# Patient Record
Sex: MALE | Race: WHITE | Employment: FULL TIME | URBAN - METROPOLITAN AREA
[De-identification: names, ages, dates, MRNs, and addresses within clinical notes are randomized per-mention and may not be internally consistent; named-entity substitution may affect disease eponyms.]

---

## 2019-12-05 ENCOUNTER — APPOINTMENT (OUTPATIENT)
Dept: GENERAL RADIOLOGY | Age: 39
End: 2019-12-05
Attending: EMERGENCY MEDICINE
Payer: COMMERCIAL

## 2019-12-05 ENCOUNTER — HOSPITAL ENCOUNTER (OUTPATIENT)
Age: 39
Discharge: HOME | End: 2019-12-05
Attending: EMERGENCY MEDICINE
Payer: COMMERCIAL

## 2019-12-05 VITALS
BODY MASS INDEX: 25.18 KG/M2 | OXYGEN SATURATION: 100 % | DIASTOLIC BLOOD PRESSURE: 87 MMHG | RESPIRATION RATE: 16 BRPM | TEMPERATURE: 98 F | SYSTOLIC BLOOD PRESSURE: 124 MMHG | HEIGHT: 73 IN | WEIGHT: 190 LBS | HEART RATE: 84 BPM

## 2019-12-05 DIAGNOSIS — S93.401A SPRAIN OF RIGHT ANKLE, UNSPECIFIED LIGAMENT, INITIAL ENCOUNTER: Primary | ICD-10-CM

## 2019-12-05 PROCEDURE — 73610 X-RAY EXAM OF ANKLE: CPT | Performed by: EMERGENCY MEDICINE

## 2019-12-05 PROCEDURE — 99203 OFFICE O/P NEW LOW 30 MIN: CPT

## 2019-12-05 RX ORDER — FLUTICASONE PROPIONATE 50 MCG
SPRAY, SUSPENSION (ML) NASAL DAILY
COMMUNITY
End: 2021-12-17

## 2019-12-05 NOTE — ED PROVIDER NOTES
Patient Seen in: [address]      History   Patient presents with:   Ankle Pain    Stated Complaint: right ankle pain    HPI    Patient is a 41-year-old male who presents to immediate care complaining of right ankle pain a Conjunctiva/sclera: Conjunctivae normal.      Pupils: Pupils are equal, round, and reactive to light. Neck:      Musculoskeletal: Normal range of motion and neck supple. Cardiovascular:      Rate and Rhythm: Normal rate and regular rhythm.       Puls

## 2019-12-05 NOTE — ED INITIAL ASSESSMENT (HPI)
Pt to IC with pain in right ankle after twisting it while going down the stairs today. Denies previous injury.

## 2020-01-25 ENCOUNTER — HOSPITAL ENCOUNTER (OUTPATIENT)
Age: 40
Discharge: HOME | End: 2020-01-25
Attending: FAMILY MEDICINE
Payer: COMMERCIAL

## 2020-01-25 VITALS
OXYGEN SATURATION: 98 % | DIASTOLIC BLOOD PRESSURE: 82 MMHG | SYSTOLIC BLOOD PRESSURE: 127 MMHG | WEIGHT: 190 LBS | RESPIRATION RATE: 18 BRPM | HEIGHT: 73 IN | BODY MASS INDEX: 25.18 KG/M2 | HEART RATE: 117 BPM | TEMPERATURE: 98 F

## 2020-01-25 DIAGNOSIS — I88.9 LYMPHADENITIS: Primary | ICD-10-CM

## 2020-01-25 PROCEDURE — 99214 OFFICE O/P EST MOD 30 MIN: CPT

## 2020-01-25 PROCEDURE — 99213 OFFICE O/P EST LOW 20 MIN: CPT

## 2020-01-25 RX ORDER — CLINDAMYCIN HYDROCHLORIDE 300 MG/1
300 CAPSULE ORAL 3 TIMES DAILY
Qty: 30 CAPSULE | Refills: 0 | Status: SHIPPED | OUTPATIENT
Start: 2020-01-25 | End: 2020-02-04

## 2020-01-25 NOTE — ED PROVIDER NOTES
Patient Seen in: [address]      History   Patient presents with:  Ear Problem Pain    Stated Complaint: ear pain    HPI    Pt is a 34 yo with worsening left ear pain. No cold sx and no fevers.  Took 2 doses of left over Normal rate. Pulmonary:      Effort: Pulmonary effort is normal.   Lymphadenopathy:      Cervical: Cervical adenopathy present. Skin:     General: Skin is warm. Capillary Refill: Capillary refill takes less than 2 seconds.    Neurological:      Gen

## 2020-01-25 NOTE — ED INITIAL ASSESSMENT (HPI)
Patient reports his ear has been hurting him for about 4 days. Patient reports he had a left over zpak that he started to take, but the left ear is just getting worse.

## 2020-02-14 ENCOUNTER — HOSPITAL ENCOUNTER (OUTPATIENT)
Age: 40
Discharge: HOME | End: 2020-02-14
Attending: FAMILY MEDICINE
Payer: COMMERCIAL

## 2020-02-14 VITALS
OXYGEN SATURATION: 100 % | WEIGHT: 190 LBS | DIASTOLIC BLOOD PRESSURE: 81 MMHG | HEART RATE: 102 BPM | TEMPERATURE: 98 F | BODY MASS INDEX: 25.18 KG/M2 | HEIGHT: 73 IN | RESPIRATION RATE: 16 BRPM | SYSTOLIC BLOOD PRESSURE: 108 MMHG

## 2020-02-14 DIAGNOSIS — J06.9 VIRAL UPPER RESPIRATORY TRACT INFECTION: Primary | ICD-10-CM

## 2020-02-14 LAB — S PYO AG THROAT QL: NEGATIVE

## 2020-02-14 PROCEDURE — 99212 OFFICE O/P EST SF 10 MIN: CPT

## 2020-02-14 PROCEDURE — 87430 STREP A AG IA: CPT

## 2020-02-14 PROCEDURE — 99213 OFFICE O/P EST LOW 20 MIN: CPT

## 2020-02-14 NOTE — ED INITIAL ASSESSMENT (HPI)
Patient states having sore throat x several days, minimal productive cough. Patient denies chest pain or SOB. Patient denies fever, states having chills last night. Patient took last dose of Ibuprofen this morning.   Patient states he had strep x 4 month Statement Selected

## 2020-12-07 ENCOUNTER — HOSPITAL ENCOUNTER (OUTPATIENT)
Age: 40
Discharge: HOME | End: 2020-12-07
Payer: COMMERCIAL

## 2020-12-07 VITALS
DIASTOLIC BLOOD PRESSURE: 93 MMHG | SYSTOLIC BLOOD PRESSURE: 131 MMHG | WEIGHT: 190 LBS | TEMPERATURE: 98 F | OXYGEN SATURATION: 98 % | RESPIRATION RATE: 16 BRPM | HEIGHT: 73 IN | HEART RATE: 112 BPM | BODY MASS INDEX: 25.18 KG/M2

## 2020-12-07 DIAGNOSIS — R09.81 NASAL CONGESTION: Primary | ICD-10-CM

## 2020-12-07 DIAGNOSIS — H66.91 RIGHT OTITIS MEDIA, UNSPECIFIED OTITIS MEDIA TYPE: ICD-10-CM

## 2020-12-07 PROCEDURE — 99203 OFFICE O/P NEW LOW 30 MIN: CPT | Performed by: NURSE PRACTITIONER

## 2020-12-07 RX ORDER — AZITHROMYCIN 250 MG/1
TABLET, FILM COATED ORAL
Qty: 1 PACKAGE | Refills: 0 | Status: SHIPPED | OUTPATIENT
Start: 2020-12-07 | End: 2020-12-12

## 2020-12-07 NOTE — ED PROVIDER NOTES
Patient presents with:  Sinus Problem: Cold since last Thursday with nasal and head congestion. Ears have started to hurt, so I'm afraid it's turning into an ear infection. I have not had COVID exposure (as far as I know).  - Entered by patient      HPI: organization: Not on file        Attends meetings of clubs or organizations: Not on file        Relationship status: Not on file      Intimate partner violence        Fear of current or ex partner: Not on file        Emotionally abused: Not on file and follow-up closely with his primary care doctor. Diagnosis:    ICD-10-CM    1. Nasal congestion  R09.81 SARS-COV-2 RNA,QUAL RT-PCR ([facility])     SARS-COV-2 RNA,QUAL RT-PCR ([facility])   2.  Right otitis media, unspecified otitis media type  H66.91        All

## 2020-12-07 NOTE — ED INITIAL ASSESSMENT (HPI)
Patient states he is having head pressure, nasal congestion and pressure and right ear pain since Thursday. He denies being exposed to covid. He states he stays at home.

## 2020-12-13 ENCOUNTER — HOSPITAL ENCOUNTER (OUTPATIENT)
Age: 40
Discharge: HOME | End: 2020-12-13
Payer: COMMERCIAL

## 2020-12-13 VITALS
HEART RATE: 98 BPM | RESPIRATION RATE: 16 BRPM | SYSTOLIC BLOOD PRESSURE: 129 MMHG | TEMPERATURE: 98 F | OXYGEN SATURATION: 100 % | DIASTOLIC BLOOD PRESSURE: 95 MMHG

## 2020-12-13 DIAGNOSIS — H92.03 OTALGIA OF BOTH EARS: Primary | ICD-10-CM

## 2020-12-13 PROCEDURE — 99213 OFFICE O/P EST LOW 20 MIN: CPT | Performed by: NURSE PRACTITIONER

## 2020-12-13 NOTE — ED PROVIDER NOTES
Patient Seen in: [facility]    History   CC: ear pain  HPI: Mana Junggle 38year old male  who presents c/o bilat ear pain which first began earlier this month associated w/ runny nose, congestion.  Advises he had ear pain 1 week ago that bones  Eyes - sclera not injected, no discharge noted, no periorbital edema  ENT - EAC bilaterally without discharge, TM pearly grey with COL visualized appropriately bilaterally. No mastoid tenderness or overlying erythema.   No pain with palpation to fro

## 2020-12-13 NOTE — ED INITIAL ASSESSMENT (HPI)
Patient states he was seen in [address] [place] [address] on Monday, given a zpack which he finished on Friday. Patient states having bilateral ear pain that has worsened, states having chest congestion. Patient states he tested negative for COVID. Patient c/o productive cough.

## 2021-05-23 ENCOUNTER — HOSPITAL ENCOUNTER (OUTPATIENT)
Age: 41
Discharge: HOME | End: 2021-05-23
Payer: COMMERCIAL

## 2021-05-23 ENCOUNTER — APPOINTMENT (OUTPATIENT)
Dept: GENERAL RADIOLOGY | Age: 41
End: 2021-05-23
Attending: NURSE PRACTITIONER
Payer: COMMERCIAL

## 2021-05-23 VITALS
HEART RATE: 99 BPM | TEMPERATURE: 98 F | SYSTOLIC BLOOD PRESSURE: 135 MMHG | DIASTOLIC BLOOD PRESSURE: 78 MMHG | OXYGEN SATURATION: 99 % | RESPIRATION RATE: 18 BRPM

## 2021-05-23 DIAGNOSIS — R07.81 RIB PAIN: Primary | ICD-10-CM

## 2021-05-23 DIAGNOSIS — S22.31XA CLOSED FRACTURE OF ONE RIB OF RIGHT SIDE, INITIAL ENCOUNTER: ICD-10-CM

## 2021-05-23 PROCEDURE — 71101 X-RAY EXAM UNILAT RIBS/CHEST: CPT | Performed by: NURSE PRACTITIONER

## 2021-05-23 PROCEDURE — 99213 OFFICE O/P EST LOW 20 MIN: CPT | Performed by: NURSE PRACTITIONER

## 2021-05-23 RX ORDER — TRAMADOL HYDROCHLORIDE 50 MG/1
TABLET ORAL EVERY 6 HOURS PRN
Qty: 20 TABLET | Refills: 0 | Status: SHIPPED | OUTPATIENT
Start: 2021-05-23 | End: 2021-05-30

## 2021-05-23 NOTE — ED INITIAL ASSESSMENT (HPI)
Patient fell a week ago on his deck that he was working on and landed on his right side on his mid rib area. He states he has been having pain in that area that is not getting better for a full week. He fell a week ago today.

## 2021-05-23 NOTE — ED PROVIDER NOTES
Patient Seen in: [facility]      History   Patient presents with:  Trauma: Entered by patient    Stated Complaint: Abdominal Pain    HPI/Subjective: Fall  The accident occurred more than 2 days ago. The fall occurred while standing.  He l Pulse 99   Resp 18   Temp 97.7 °F (36.5 °C)   Temp src Temporal   SpO2 99 %   O2 Device None (Room air)       Current:/78   Pulse 99   Temp 97.7 °F (36.5 °C) (Temporal)   Resp 18   SpO2 99%         Physical Exam  Vitals and nursing note reviewed. Reviewed - No data to display        CONCLUSION:   1. Subacute-appearing right posterolateral 10th rib fracture.     2. No pneumothorax.    3. Negative for radiographically evident acute intrathoracic process.     4. Sequelae of remote granulomatous disease of the right ribs subacute appearing right posterior lateral 10th rib fracture. No pneumothorax. Negative for radiographic evident acute intrathoracic process. Prescription for tramadol was sent to the pharmacy. Rice therapy advised.   Motrin over-the-c

## 2021-06-21 ENCOUNTER — HOSPITAL ENCOUNTER (OUTPATIENT)
Age: 41
Discharge: HOME | End: 2021-06-21
Payer: COMMERCIAL

## 2021-06-21 VITALS
RESPIRATION RATE: 17 BRPM | SYSTOLIC BLOOD PRESSURE: 130 MMHG | OXYGEN SATURATION: 98 % | HEIGHT: 73 IN | TEMPERATURE: 97 F | HEART RATE: 82 BPM | BODY MASS INDEX: 25.84 KG/M2 | DIASTOLIC BLOOD PRESSURE: 90 MMHG | WEIGHT: 195 LBS

## 2021-06-21 DIAGNOSIS — J02.0 STREPTOCOCCAL SORE THROAT: Primary | ICD-10-CM

## 2021-06-21 PROCEDURE — 99213 OFFICE O/P EST LOW 20 MIN: CPT | Performed by: NURSE PRACTITIONER

## 2021-06-21 PROCEDURE — 87880 STREP A ASSAY W/OPTIC: CPT | Performed by: NURSE PRACTITIONER

## 2021-06-21 RX ORDER — AZITHROMYCIN 500 MG/1
500 TABLET, FILM COATED ORAL DAILY
Qty: 5 TABLET | Refills: 0 | Status: SHIPPED | OUTPATIENT
Start: 2021-06-21 | End: 2021-07-02

## 2021-06-21 NOTE — ED PROVIDER NOTES
Patient Seen in: [facility]      History   Patient presents with:  Sore Throat    Stated Complaint: Sore throat    HPI/Subjective:   HPI    38 yr old male here today with 2-3 days of strep throat. Denies fever, chills, nausea, vomiting.  Amy S1-S2. Regular rate and rhythm. Lungs: good inspiratory effort. +air entry bilaterally without wheezes, rhonchi, crackles. No accessory muscle use or tachypnea. Extremities: No edema. Pulses 2+ extremities. Skin: No rash noted.   No ec dysphagia or difficulty handing secretions. No dental pain. Afebrile, nontoxic appearing and does not meet SIRS criteria. Rapid strep test is positive. Does not appear clinically dehydrated, tolerating oral intake in the emergency room.   Prescription g

## 2021-06-29 ENCOUNTER — HOSPITAL ENCOUNTER (OUTPATIENT)
Age: 41
Discharge: HOME | End: 2021-06-29
Payer: COMMERCIAL

## 2021-06-29 VITALS
WEIGHT: 190 LBS | RESPIRATION RATE: 16 BRPM | HEART RATE: 95 BPM | HEIGHT: 73 IN | SYSTOLIC BLOOD PRESSURE: 121 MMHG | BODY MASS INDEX: 25.18 KG/M2 | DIASTOLIC BLOOD PRESSURE: 86 MMHG | TEMPERATURE: 98 F | OXYGEN SATURATION: 98 %

## 2021-06-29 DIAGNOSIS — J02.0 STREPTOCOCCAL SORE THROAT: Primary | ICD-10-CM

## 2021-06-29 PROCEDURE — 99213 OFFICE O/P EST LOW 20 MIN: CPT | Performed by: NURSE PRACTITIONER

## 2021-06-29 PROCEDURE — 87880 STREP A ASSAY W/OPTIC: CPT | Performed by: NURSE PRACTITIONER

## 2021-06-29 RX ORDER — CLARITHROMYCIN 500 MG/1
500 TABLET, COATED ORAL 2 TIMES DAILY
Qty: 20 TABLET | Refills: 0 | Status: SHIPPED | OUTPATIENT
Start: 2021-06-29 | End: 2021-07-02

## 2021-06-29 NOTE — ED PROVIDER NOTES
Patient Seen in: [facility]      History   Patient presents with:  Sore Throat: Entered by patient    Stated Complaint: Sore Throat    HPI/Subjective:   HPI    38year old male presents with sore throat that began this a.m. he states he did Positive (*)     All other components within normal limits                    MDM        Pt declines Zpak  history of anaphylaxis to penicillins  We will treat with clarithromycin 250 mg twice daily x10 days PMD follow-up strict return precautions given

## 2021-07-02 ENCOUNTER — HOSPITAL ENCOUNTER (OUTPATIENT)
Age: 41
Discharge: HOME | End: 2021-07-02
Payer: COMMERCIAL

## 2021-07-02 ENCOUNTER — HOSPITAL ENCOUNTER (OUTPATIENT)
Age: 41
Discharge: LEFT BEFORE DISCHARGE | End: 2021-07-02
Payer: COMMERCIAL

## 2021-07-02 VITALS
DIASTOLIC BLOOD PRESSURE: 92 MMHG | SYSTOLIC BLOOD PRESSURE: 120 MMHG | OXYGEN SATURATION: 100 % | RESPIRATION RATE: 20 BRPM | HEART RATE: 105 BPM | TEMPERATURE: 98 F

## 2021-07-02 VITALS
TEMPERATURE: 98 F | RESPIRATION RATE: 16 BRPM | HEART RATE: 88 BPM | SYSTOLIC BLOOD PRESSURE: 138 MMHG | WEIGHT: 190 LBS | OXYGEN SATURATION: 99 % | BODY MASS INDEX: 25.18 KG/M2 | HEIGHT: 73 IN | DIASTOLIC BLOOD PRESSURE: 94 MMHG

## 2021-07-02 DIAGNOSIS — J02.0 STREP PHARYNGITIS: Primary | ICD-10-CM

## 2021-07-02 PROCEDURE — 99213 OFFICE O/P EST LOW 20 MIN: CPT | Performed by: PHYSICIAN ASSISTANT

## 2021-07-02 RX ORDER — CLINDAMYCIN HYDROCHLORIDE 300 MG/1
300 CAPSULE ORAL 3 TIMES DAILY
Qty: 21 CAPSULE | Refills: 0 | Status: SHIPPED | OUTPATIENT
Start: 2021-07-02 | End: 2021-07-09

## 2021-07-02 RX ORDER — DEXAMETHASONE SODIUM PHOSPHATE 10 MG/ML
10 INJECTION, SOLUTION INTRAMUSCULAR; INTRAVENOUS ONCE
Status: COMPLETED | OUTPATIENT
Start: 2021-07-02 | End: 2021-07-02

## 2021-07-02 NOTE — ED INITIAL ASSESSMENT (HPI)
Pt states seen here 6/28 and dx'd with strep. States no better with clarithromycin. Pt continues with sore throat and feeling tired x7 days. States also had strep throat 2 weeks ago when his kids had strep. Awake/alert.  Breathing easy and even without d

## 2021-07-02 NOTE — ED PROVIDER NOTES
Patient Seen in: [facility]      History   Patient presents with:  Sore Throat: Entered by patient  Recheck    Stated Complaint: Sore Throat    HPI/Subjective:   HPI    39 yo male with here for sore throat.   Patient was diagnosed with strep round, and reactive to light. Cardiovascular:      Rate and Rhythm: Normal rate. Pulmonary:      Effort: Pulmonary effort is normal.   Abdominal:      General: Abdomen is flat. Musculoskeletal:         General: Normal range of motion.       Cervical b

## 2021-07-05 ENCOUNTER — HOSPITAL ENCOUNTER (OUTPATIENT)
Age: 41
Discharge: HOME | End: 2021-07-05
Payer: COMMERCIAL

## 2021-07-05 VITALS
DIASTOLIC BLOOD PRESSURE: 88 MMHG | HEART RATE: 92 BPM | OXYGEN SATURATION: 98 % | TEMPERATURE: 98 F | RESPIRATION RATE: 20 BRPM | SYSTOLIC BLOOD PRESSURE: 126 MMHG

## 2021-07-05 DIAGNOSIS — H92.02 LEFT EAR PAIN: Primary | ICD-10-CM

## 2021-07-05 LAB — SARS-COV-2 RNA RESP QL NAA+PROBE: NOT DETECTED

## 2021-07-05 PROCEDURE — U0002 COVID-19 LAB TEST NON-CDC: HCPCS | Performed by: NURSE PRACTITIONER

## 2021-07-05 PROCEDURE — 99213 OFFICE O/P EST LOW 20 MIN: CPT | Performed by: NURSE PRACTITIONER

## 2021-07-05 NOTE — ED PROVIDER NOTES
Patient Seen in: [facility]    History   CC: ear pain  HPI: Eve Knife 38year old male  who presents c/o left ear pain which has been present for the last 1 to 2 days. States he has been treated for strep for the last 2 weeks.   Genny Holter Physical Exam     ED Triage Vitals [07/05/21 0843]   /88   Pulse 108   Resp 20   Temp 97.5 °F (36.4 °C)   Temp src    SpO2 100 %   O2 Device None (Room air)       Current:/88   Pulse 108   Temp 97.5 °F (36.4 °C)   Resp 20   SpO2 100% Has an appointment with ENT, Dr. Khushi Decker on 7/9.       Discussed general otalgia instructions with patient, continued antibiotic use as well as initiation of probiotic, Florastor use, continued follow-up with Dr. Khushi Decker on 7/9 as previously planned as well as

## 2021-07-05 NOTE — ED INITIAL ASSESSMENT (HPI)
Pt presents to the IC with c/o left ear pain for the last couple days. Pt has been treated for strep throat with 3 different antibiotics in the last 2 weeks with improvement mid clindamycin treatment at this time.  Pt is concerned that he may have an ear in

## 2021-07-12 ENCOUNTER — OFFICE VISIT (OUTPATIENT)
Dept: OTOLARYNGOLOGY | Facility: CLINIC | Age: 41
End: 2021-07-12
Payer: COMMERCIAL

## 2021-07-12 VITALS
SYSTOLIC BLOOD PRESSURE: 126 MMHG | HEART RATE: 80 BPM | HEIGHT: 73 IN | WEIGHT: 190 LBS | BODY MASS INDEX: 25.18 KG/M2 | TEMPERATURE: 97 F | DIASTOLIC BLOOD PRESSURE: 93 MMHG

## 2021-07-12 DIAGNOSIS — J03.90 TONSILLITIS: Primary | ICD-10-CM

## 2021-07-12 PROCEDURE — 99203 OFFICE O/P NEW LOW 30 MIN: CPT | Performed by: OTOLARYNGOLOGY

## 2021-07-12 PROCEDURE — 3008F BODY MASS INDEX DOCD: CPT | Performed by: OTOLARYNGOLOGY

## 2021-07-12 PROCEDURE — 3080F DIAST BP >= 90 MM HG: CPT | Performed by: OTOLARYNGOLOGY

## 2021-07-12 PROCEDURE — 3074F SYST BP LT 130 MM HG: CPT | Performed by: OTOLARYNGOLOGY

## 2021-07-12 NOTE — PROGRESS NOTES
Dane Evans is a 38year old male.   Patient presents with:  Sore Throat: , dx with strep at Urgent care 6/21 started on zpack , did help and than symptoms returned and  pt tested  positive  for strep 06/29,  started on clarithromycin and advised to 190 lb (86.2 kg)   BMI 25.07 kg/m²        Constitutional Normal Overall appearance - Normal.   Psychiatric Normal Orientation - Oriented to time, place, person & situation. Appropriate mood and affect.    Neck Exam Normal Inspection - Normal. Palpation - No

## 2021-08-05 ENCOUNTER — HOSPITAL ENCOUNTER (OUTPATIENT)
Age: 41
Discharge: HOME | End: 2021-08-05
Payer: COMMERCIAL

## 2021-08-05 VITALS
HEIGHT: 73 IN | HEART RATE: 99 BPM | RESPIRATION RATE: 16 BRPM | WEIGHT: 190 LBS | DIASTOLIC BLOOD PRESSURE: 85 MMHG | TEMPERATURE: 97 F | OXYGEN SATURATION: 99 % | BODY MASS INDEX: 25.18 KG/M2 | SYSTOLIC BLOOD PRESSURE: 136 MMHG

## 2021-08-05 DIAGNOSIS — H92.02 LEFT EAR PAIN: Primary | ICD-10-CM

## 2021-08-05 DIAGNOSIS — Z20.822 LAB TEST NEGATIVE FOR COVID-19 VIRUS: ICD-10-CM

## 2021-08-05 DIAGNOSIS — J02.9 VIRAL PHARYNGITIS: ICD-10-CM

## 2021-08-05 LAB
S PYO AG THROAT QL: NEGATIVE
SARS-COV-2 RNA RESP QL NAA+PROBE: NOT DETECTED

## 2021-08-05 PROCEDURE — U0002 COVID-19 LAB TEST NON-CDC: HCPCS | Performed by: NURSE PRACTITIONER

## 2021-08-05 PROCEDURE — 87880 STREP A ASSAY W/OPTIC: CPT | Performed by: NURSE PRACTITIONER

## 2021-08-05 PROCEDURE — 99213 OFFICE O/P EST LOW 20 MIN: CPT | Performed by: NURSE PRACTITIONER

## 2021-08-05 NOTE — ED INITIAL ASSESSMENT (HPI)
Pt c/o L ear pain and sore throat x2 days. No fever. No cold/cough. Awake/alert. Breathing easy and even without distress. Speech clear. Skin warm, dry and pink.

## 2021-08-05 NOTE — ED PROVIDER NOTES
Patient Seen in: [facility]      History   Patient presents with:  Ear Pain  Sore Throat    Stated Complaint: ear pain    HPI/Subjective:   HPI    This is 42yo male presenting with ear pain and sore throat.   Patient states, for 2 days he has sounds: Normal heart sounds. Pulmonary:      Effort: Pulmonary effort is normal.      Breath sounds: Normal breath sounds. Abdominal:      Palpations: Abdomen is soft. Musculoskeletal:         General: Normal range of motion.       Cervical back: Norm worsen or persist          Medications Prescribed:  Current Discharge Medication List

## 2021-08-27 ENCOUNTER — HOSPITAL ENCOUNTER (OUTPATIENT)
Age: 41
Discharge: HOME | End: 2021-08-27
Attending: PHYSICIAN ASSISTANT
Payer: COMMERCIAL

## 2021-08-27 VITALS
BODY MASS INDEX: 24.52 KG/M2 | SYSTOLIC BLOOD PRESSURE: 141 MMHG | HEIGHT: 73 IN | HEART RATE: 100 BPM | RESPIRATION RATE: 16 BRPM | WEIGHT: 185 LBS | DIASTOLIC BLOOD PRESSURE: 94 MMHG | OXYGEN SATURATION: 98 % | TEMPERATURE: 98 F

## 2021-08-27 DIAGNOSIS — Z20.822 ENCOUNTER FOR LABORATORY TESTING FOR COVID-19 VIRUS: Primary | ICD-10-CM

## 2021-08-27 DIAGNOSIS — R03.0 ELEVATED BLOOD PRESSURE READING: ICD-10-CM

## 2021-08-27 LAB — SARS-COV-2 RNA RESP QL NAA+PROBE: NOT DETECTED

## 2021-08-27 PROCEDURE — 99212 OFFICE O/P EST SF 10 MIN: CPT | Performed by: PHYSICIAN ASSISTANT

## 2021-08-27 PROCEDURE — U0002 COVID-19 LAB TEST NON-CDC: HCPCS | Performed by: PHYSICIAN ASSISTANT

## 2021-08-27 NOTE — ED INITIAL ASSESSMENT (HPI)
Pt presents to the IC with c/o wanting a covid test s/p being exposed to an unvaccinated person. Pt is asymptomatic.

## 2021-08-27 NOTE — ED PROVIDER NOTES
Patient Seen in: [facility]    History   Patient presents with:  Covid-19 Test: Entered by patient    Stated Complaint: Covid-19 Test    HPI      Edilberto Tellez is a 38year old male who presents to immediate care requesting testing for kg/m²     PULSE OX within normal limits on room air as interpreted by this provider. Constitutional: The patient is cooperative. Appears well-developed and well-nourished. No acute distress. Psychological: Alert, No abnormalities of mood, affect.   Hea I've explained the importance of following up with their doctor as instructed. The patient verbalized understanding of the discharge instructions and plan. The patient was informed of their elevated blood pressure reading at immediate care.   They were i

## 2021-08-28 ENCOUNTER — HOSPITAL ENCOUNTER (OUTPATIENT)
Age: 41
Discharge: HOME | End: 2021-08-28
Payer: COMMERCIAL

## 2021-08-28 DIAGNOSIS — Z20.822 ENCOUNTER FOR LABORATORY TESTING FOR COVID-19 VIRUS: Primary | ICD-10-CM

## 2021-08-28 PROCEDURE — 99212 OFFICE O/P EST SF 10 MIN: CPT | Performed by: NURSE PRACTITIONER

## 2021-08-28 NOTE — ED PROVIDER NOTES
Patient Seen in: [facility]      History   Patient presents with:  Covid-19 Test: Entered by patient    Stated Complaint: Covid-19 Test    HPI/Subjective:   HPI    Patient presents to the immediate care requesting a COVID-19 test.  Patient s noted in HPI. Constitutional and vital signs reviewed. All other systems reviewed and negative except as noted above.     Physical Exam     ED Triage Vitals   BP    Pulse    Resp    Temp    Temp src    SpO2    O2 Device        Current:There were no vi sore throat which he declines. Patient states he is going to leave to follow-up with the rapid testing center. Patient advised to follow-up with his primary care doctor in 2-3 days.   Patient verbalizes understanding of discharge instructions and plan of

## 2021-10-10 ENCOUNTER — HOSPITAL ENCOUNTER (OUTPATIENT)
Age: 41
Discharge: HOME | End: 2021-10-10
Payer: COMMERCIAL

## 2021-10-10 VITALS — TEMPERATURE: 98 F

## 2021-10-10 PROCEDURE — 90686 IIV4 VACC NO PRSV 0.5 ML IM: CPT | Performed by: EMERGENCY MEDICINE

## 2021-10-10 PROCEDURE — 90471 IMMUNIZATION ADMIN: CPT | Performed by: EMERGENCY MEDICINE

## 2021-12-17 PROBLEM — F32.9 CURRENT EPISODE OF MAJOR DEPRESSIVE DISORDER WITHOUT PRIOR EPISODE: Status: ACTIVE | Noted: 2021-12-17

## 2021-12-17 PROBLEM — E78.00 HYPERCHOLESTEROLEMIA: Status: ACTIVE | Noted: 2021-12-17

## 2021-12-31 ENCOUNTER — HOSPITAL ENCOUNTER (OUTPATIENT)
Age: 41
Discharge: HOME | End: 2021-12-31
Payer: COMMERCIAL

## 2021-12-31 VITALS
DIASTOLIC BLOOD PRESSURE: 90 MMHG | OXYGEN SATURATION: 98 % | TEMPERATURE: 98 F | SYSTOLIC BLOOD PRESSURE: 118 MMHG | RESPIRATION RATE: 19 BRPM | HEART RATE: 108 BPM

## 2021-12-31 DIAGNOSIS — Z20.822 ENCOUNTER FOR SCREENING LABORATORY TESTING FOR COVID-19 VIRUS: ICD-10-CM

## 2021-12-31 DIAGNOSIS — B34.9 VIRAL ILLNESS: ICD-10-CM

## 2021-12-31 DIAGNOSIS — J02.0 STREPTOCOCCAL SORE THROAT: Primary | ICD-10-CM

## 2021-12-31 LAB — S PYO AG THROAT QL: POSITIVE

## 2021-12-31 PROCEDURE — 99213 OFFICE O/P EST LOW 20 MIN: CPT | Performed by: NURSE PRACTITIONER

## 2021-12-31 PROCEDURE — 87880 STREP A ASSAY W/OPTIC: CPT | Performed by: NURSE PRACTITIONER

## 2021-12-31 RX ORDER — CLINDAMYCIN HYDROCHLORIDE 300 MG/1
300 CAPSULE ORAL 3 TIMES DAILY
Qty: 30 CAPSULE | Refills: 0 | Status: SHIPPED | OUTPATIENT
Start: 2021-12-31 | End: 2022-01-10

## 2021-12-31 NOTE — ED PROVIDER NOTES
Patient Seen in: [facility]      History   Patient presents with:  Cough/URI  Sore Throat    Stated Complaint: congestion    Subjective:   Well-appearing 44-year-old male presents with complaints of nasal congestion, body aches, and throat p Left and right tympanic membranes normal.      Nose: Congestion present. Mouth/Throat:      Lips: Pink. Mouth: Mucous membranes are moist.      Pharynx: Uvula midline. Posterior oropharyngeal erythema present.  No pharyngeal swelling, oropharyngea Clinical Impression:  Streptococcal sore throat  (primary encounter diagnosis)  Encounter for screening laboratory testing for COVID-19 virus  Viral illness     Disposition:  Discharge  12/31/2021 10:03 am    Follow-up:  Emeli Perez MD  [address]

## 2021-12-31 NOTE — ED INITIAL ASSESSMENT (HPI)
Pt w/ cold symptoms x 5 days. Vaccinated. Feels nasal congestion, taking  Claritin and Flonase with minor relief.

## 2022-01-03 LAB — SARS-COV-2 RNA,QUAL, RT-PCR: NOT DETECTED

## 2022-03-31 ENCOUNTER — HOSPITAL ENCOUNTER (OUTPATIENT)
Age: 42
Discharge: HOME | End: 2022-03-31
Payer: COMMERCIAL

## 2022-03-31 VITALS
SYSTOLIC BLOOD PRESSURE: 136 MMHG | DIASTOLIC BLOOD PRESSURE: 97 MMHG | TEMPERATURE: 98 F | RESPIRATION RATE: 18 BRPM | HEART RATE: 92 BPM | OXYGEN SATURATION: 98 %

## 2022-03-31 DIAGNOSIS — J06.9 VIRAL URI: Primary | ICD-10-CM

## 2022-03-31 LAB — S PYO AG THROAT QL: NEGATIVE

## 2022-03-31 PROCEDURE — 99212 OFFICE O/P EST SF 10 MIN: CPT | Performed by: PHYSICIAN ASSISTANT

## 2022-03-31 PROCEDURE — 87880 STREP A ASSAY W/OPTIC: CPT | Performed by: PHYSICIAN ASSISTANT

## 2022-03-31 NOTE — ED INITIAL ASSESSMENT (HPI)
Patient is here with cold symptoms that started three days ago. He just wants to be checked out d/t travel in a few days.

## 2022-05-11 ENCOUNTER — APPOINTMENT (OUTPATIENT)
Dept: GENERAL RADIOLOGY | Age: 42
End: 2022-05-11
Attending: PHYSICIAN ASSISTANT
Payer: COMMERCIAL

## 2022-05-11 ENCOUNTER — HOSPITAL ENCOUNTER (OUTPATIENT)
Age: 42
Discharge: HOME | End: 2022-05-11
Payer: COMMERCIAL

## 2022-05-11 VITALS
TEMPERATURE: 99 F | HEART RATE: 96 BPM | WEIGHT: 188 LBS | HEIGHT: 73 IN | RESPIRATION RATE: 16 BRPM | BODY MASS INDEX: 24.92 KG/M2 | OXYGEN SATURATION: 98 %

## 2022-05-11 DIAGNOSIS — J02.9 ACUTE VIRAL PHARYNGITIS: Primary | ICD-10-CM

## 2022-05-11 DIAGNOSIS — R05.9 COUGH: ICD-10-CM

## 2022-05-11 DIAGNOSIS — Z20.822 ENCOUNTER FOR SCREENING LABORATORY TESTING FOR COVID-19 VIRUS: ICD-10-CM

## 2022-05-11 LAB
S PYO AG THROAT QL: NEGATIVE
SARS-COV-2 RNA RESP QL NAA+PROBE: NOT DETECTED

## 2022-05-11 PROCEDURE — 87880 STREP A ASSAY W/OPTIC: CPT | Performed by: PHYSICIAN ASSISTANT

## 2022-05-11 PROCEDURE — U0002 COVID-19 LAB TEST NON-CDC: HCPCS | Performed by: PHYSICIAN ASSISTANT

## 2022-05-11 PROCEDURE — 99213 OFFICE O/P EST LOW 20 MIN: CPT | Performed by: PHYSICIAN ASSISTANT

## 2022-05-11 PROCEDURE — 71046 X-RAY EXAM CHEST 2 VIEWS: CPT | Performed by: PHYSICIAN ASSISTANT

## 2022-05-11 RX ORDER — IBUPROFEN 600 MG/1
TABLET ORAL
Qty: 20 TABLET | Refills: 0 | Status: SHIPPED | OUTPATIENT
Start: 2022-05-11

## 2022-10-01 ENCOUNTER — HOSPITAL ENCOUNTER (OUTPATIENT)
Age: 42
Discharge: HOME | End: 2022-10-01
Payer: COMMERCIAL

## 2022-10-01 VITALS
HEART RATE: 95 BPM | TEMPERATURE: 98 F | OXYGEN SATURATION: 98 % | DIASTOLIC BLOOD PRESSURE: 82 MMHG | SYSTOLIC BLOOD PRESSURE: 139 MMHG | RESPIRATION RATE: 18 BRPM

## 2022-10-01 DIAGNOSIS — J02.9 VIRAL PHARYNGITIS: Primary | ICD-10-CM

## 2022-10-01 LAB
S PYO AG THROAT QL: NEGATIVE
SARS-COV-2 RNA RESP QL NAA+PROBE: NOT DETECTED

## 2022-12-07 ENCOUNTER — HOSPITAL ENCOUNTER (OUTPATIENT)
Age: 42
Discharge: HOME | End: 2022-12-07
Payer: COMMERCIAL

## 2022-12-07 VITALS
SYSTOLIC BLOOD PRESSURE: 132 MMHG | OXYGEN SATURATION: 98 % | DIASTOLIC BLOOD PRESSURE: 87 MMHG | RESPIRATION RATE: 18 BRPM | HEART RATE: 95 BPM | TEMPERATURE: 98 F

## 2022-12-07 DIAGNOSIS — J11.1 INFLUENZA: ICD-10-CM

## 2022-12-07 DIAGNOSIS — H66.91 RIGHT OTITIS MEDIA, UNSPECIFIED OTITIS MEDIA TYPE: Primary | ICD-10-CM

## 2022-12-07 LAB
POCT INFLUENZA A: POSITIVE
POCT INFLUENZA B: NEGATIVE

## 2022-12-07 PROCEDURE — 99213 OFFICE O/P EST LOW 20 MIN: CPT | Performed by: NURSE PRACTITIONER

## 2022-12-07 PROCEDURE — 87502 INFLUENZA DNA AMP PROBE: CPT | Performed by: NURSE PRACTITIONER

## 2022-12-07 RX ORDER — CLINDAMYCIN HYDROCHLORIDE 300 MG/1
300 CAPSULE ORAL 3 TIMES DAILY
Qty: 30 CAPSULE | Refills: 0 | Status: SHIPPED | OUTPATIENT
Start: 2022-12-07 | End: 2022-12-17

## 2022-12-07 NOTE — ED INITIAL ASSESSMENT (HPI)
Pt presents with fever 2 weeks ago. Fever has subsided. Pt reports sore throat with right ear pain and cough x 2 days.

## 2022-12-07 NOTE — DISCHARGE INSTRUCTIONS
Tylenol or Motrin as needed for pain or fever. Take the antibiotics as prescribed. Continue supportive care. Follow-up with your doctor. Return for any concerns.

## 2023-02-25 ENCOUNTER — HOSPITAL ENCOUNTER (OUTPATIENT)
Age: 43
Discharge: HOME | End: 2023-02-25
Payer: COMMERCIAL

## 2023-02-25 VITALS
TEMPERATURE: 98 F | HEART RATE: 76 BPM | DIASTOLIC BLOOD PRESSURE: 90 MMHG | RESPIRATION RATE: 18 BRPM | SYSTOLIC BLOOD PRESSURE: 126 MMHG | OXYGEN SATURATION: 99 %

## 2023-02-25 DIAGNOSIS — H66.92 LEFT OTITIS MEDIA, UNSPECIFIED OTITIS MEDIA TYPE: ICD-10-CM

## 2023-02-25 DIAGNOSIS — H92.02 OTALGIA, LEFT EAR: Primary | ICD-10-CM

## 2023-02-25 PROCEDURE — 99213 OFFICE O/P EST LOW 20 MIN: CPT | Performed by: NURSE PRACTITIONER

## 2023-02-25 RX ORDER — DOXYCYCLINE HYCLATE 100 MG/1
100 CAPSULE ORAL 2 TIMES DAILY
Qty: 14 CAPSULE | Refills: 0 | Status: SHIPPED | OUTPATIENT
Start: 2023-02-25 | End: 2023-03-04

## 2023-02-25 NOTE — DISCHARGE INSTRUCTIONS
Ibuprofen 400-600 mg every 8 hours for 2-3 days  Take Flonase as directed on packaging  Take over the counter Mucinex  If no improvement or worse, start antibiotic as discussed

## 2023-08-09 ENCOUNTER — HOSPITAL ENCOUNTER (OUTPATIENT)
Age: 43
Discharge: HOME | End: 2023-08-09
Payer: COMMERCIAL

## 2023-08-09 VITALS
SYSTOLIC BLOOD PRESSURE: 133 MMHG | TEMPERATURE: 97 F | OXYGEN SATURATION: 100 % | DIASTOLIC BLOOD PRESSURE: 85 MMHG | RESPIRATION RATE: 18 BRPM | HEART RATE: 80 BPM

## 2023-08-09 DIAGNOSIS — B97.89 VIRAL SORE THROAT: Primary | ICD-10-CM

## 2023-08-09 DIAGNOSIS — J02.8 VIRAL SORE THROAT: Primary | ICD-10-CM

## 2023-08-09 LAB — S PYO AG THROAT QL: NEGATIVE

## 2023-08-09 PROCEDURE — 99213 OFFICE O/P EST LOW 20 MIN: CPT | Performed by: NURSE PRACTITIONER

## 2023-08-09 PROCEDURE — 87880 STREP A ASSAY W/OPTIC: CPT | Performed by: NURSE PRACTITIONER

## 2023-11-01 ENCOUNTER — HOSPITAL ENCOUNTER (OUTPATIENT)
Age: 43
Discharge: HOME | End: 2023-11-01
Payer: COMMERCIAL

## 2023-11-01 VITALS
OXYGEN SATURATION: 100 % | SYSTOLIC BLOOD PRESSURE: 128 MMHG | RESPIRATION RATE: 16 BRPM | TEMPERATURE: 97 F | HEART RATE: 94 BPM | DIASTOLIC BLOOD PRESSURE: 96 MMHG

## 2023-11-01 DIAGNOSIS — J02.9 VIRAL PHARYNGITIS: Primary | ICD-10-CM

## 2023-11-01 LAB
S PYO AG THROAT QL: NEGATIVE
SARS-COV-2 RNA RESP QL NAA+PROBE: NOT DETECTED

## 2023-11-01 PROCEDURE — 99213 OFFICE O/P EST LOW 20 MIN: CPT | Performed by: PHYSICIAN ASSISTANT

## 2023-11-01 PROCEDURE — 87880 STREP A ASSAY W/OPTIC: CPT | Performed by: PHYSICIAN ASSISTANT

## 2023-11-01 PROCEDURE — U0002 COVID-19 LAB TEST NON-CDC: HCPCS | Performed by: PHYSICIAN ASSISTANT

## 2023-11-02 NOTE — DISCHARGE INSTRUCTIONS
Alternate Tylenol and Motrin every 3 hours for pain or fever > 100.4 degrees  Drink plenty of fluids   Get plenty of rest     You may benefit from taking a decongestant (e.g. Sudafed)  You may benefit from taking a daily allergy medication (e.g. Zyrtec)  You may benefit from using a humidifier    Sleep with head elevated and avoid laying flat  Avoid having air blow on your face    Wash hands often  Disinfect your environment  Do not share utensils or drinks    Symptoms may take a few weeks to resolve  Follow up with your primary care provider

## 2023-11-24 ENCOUNTER — OFFICE VISIT (OUTPATIENT)
Dept: URGENT CARE | Facility: CLINIC | Age: 43
End: 2023-11-24
Payer: COMMERCIAL

## 2023-11-24 VITALS
OXYGEN SATURATION: 99 % | RESPIRATION RATE: 20 BRPM | SYSTOLIC BLOOD PRESSURE: 122 MMHG | HEART RATE: 104 BPM | TEMPERATURE: 100 F | DIASTOLIC BLOOD PRESSURE: 75 MMHG

## 2023-11-24 DIAGNOSIS — J02.9 SORE THROAT: ICD-10-CM

## 2023-11-24 DIAGNOSIS — B34.9 VIRAL SYNDROME: Primary | ICD-10-CM

## 2023-11-24 PROBLEM — F32.9 CURRENT EPISODE OF MAJOR DEPRESSIVE DISORDER WITHOUT PRIOR EPISODE: Status: ACTIVE | Noted: 2021-12-17

## 2023-11-24 PROBLEM — E78.00 HYPERCHOLESTEROLEMIA: Status: ACTIVE | Noted: 2021-12-17

## 2023-11-24 LAB
CTP QC/QA: YES
MOLECULAR STREP A: NEGATIVE
POC MOLECULAR INFLUENZA A AGN: NEGATIVE
POC MOLECULAR INFLUENZA B AGN: NEGATIVE
SARS-COV-2 AG RESP QL IA.RAPID: NEGATIVE

## 2023-11-24 PROCEDURE — 99203 PR OFFICE/OUTPT VISIT, NEW, LEVL III, 30-44 MIN: ICD-10-PCS | Mod: S$GLB,,,

## 2023-11-24 PROCEDURE — 87811 SARS CORONAVIRUS 2 ANTIGEN POCT, MANUAL READ: ICD-10-PCS | Mod: QW,S$GLB,,

## 2023-11-24 PROCEDURE — 87502 INFLUENZA DNA AMP PROBE: CPT | Mod: QW,S$GLB,,

## 2023-11-24 PROCEDURE — 87811 SARS-COV-2 COVID19 W/OPTIC: CPT | Mod: QW,S$GLB,,

## 2023-11-24 PROCEDURE — 87651 STREP A DNA AMP PROBE: CPT | Mod: QW,S$GLB,,

## 2023-11-24 PROCEDURE — 87502 POCT INFLUENZA A/B MOLECULAR: ICD-10-PCS | Mod: QW,S$GLB,,

## 2023-11-24 PROCEDURE — 99203 OFFICE O/P NEW LOW 30 MIN: CPT | Mod: S$GLB,,,

## 2023-11-24 PROCEDURE — 87651 POCT STREP A MOLECULAR: ICD-10-PCS | Mod: QW,S$GLB,,

## 2023-11-24 RX ORDER — SILDENAFIL 100 MG/1
TABLET, FILM COATED ORAL
COMMUNITY
Start: 2023-09-10

## 2023-11-24 RX ORDER — PAROXETINE 30 MG/1
TABLET, FILM COATED ORAL
COMMUNITY
Start: 2022-12-02

## 2023-11-24 RX ORDER — ATORVASTATIN CALCIUM 40 MG/1
40 TABLET, FILM COATED ORAL DAILY
COMMUNITY
Start: 2023-05-01

## 2023-11-24 NOTE — PROGRESS NOTES
Subjective:      Patient ID: Yosvany More is a 43 y.o. male.    Vitals:  temperature is 99.5 °F (37.5 °C). His blood pressure is 122/75 and his pulse is 104. His respiration is 20 and oxygen saturation is 99%.     Chief Complaint: Sore Throat    Patient presents with a sore throat. Associated symptoms include nonbloody diarrhea. Onset of symptoms started 3 days ago. Patient states that he believes that he could of had some bad food early in the week but also states that his son was sick recently as well with stomach issues. Patient states that he is able to tolerate food and beverage. He denies fever, chills, chest pain, shortness of breath, abdominal pain, urinary changes. He denies recent antibiotics, recent foreign travel, recent hospitalizations. Patient states that his diarrhea has decreased in frequency.    Sore Throat   This is a new problem. The current episode started in the past 7 days (3-4 days ago). The problem has been unchanged. Neither side of throat is experiencing more pain than the other. There has been no fever. The pain is at a severity of 6/10. The pain is moderate. Associated symptoms include diarrhea. Pertinent negatives include no abdominal pain, congestion, coughing, drooling, ear discharge, headaches, hoarse voice, plugged ear sensation, neck pain, shortness of breath, stridor, swollen glands, trouble swallowing or vomiting. He has had no exposure to strep or mono. He has tried NSAIDs for the symptoms. The treatment provided mild relief.       Constitution: Negative for chills and fever.   HENT:  Positive for sore throat. Negative for ear discharge, drooling, congestion and trouble swallowing.    Neck: Negative for neck pain.   Cardiovascular:  Negative for chest pain and sob on exertion.   Respiratory:  Negative for cough, shortness of breath and stridor.    Gastrointestinal:  Positive for diarrhea. Negative for abdominal pain, nausea and vomiting.   Genitourinary:  Negative for flank  pain and hematuria.   Musculoskeletal:  Negative for muscle ache.   Skin:  Negative for rash.   Neurological:  Negative for headaches.      Objective:     Physical Exam   Constitutional:  Non-toxic appearance. He does not appear ill. No distress.      Comments:Patient is in no acute distress, patient is non-toxic appearing, patient is ox3, patient is answering question appropriately.   normal  HENT:   Head: Normocephalic.   Ears:   Right Ear: Tympanic membrane, external ear and ear canal normal. impacted cerumen  Left Ear: Tympanic membrane, external ear and ear canal normal. impacted cerumen  Nose: No rhinorrhea or congestion.   Mouth/Throat: Uvula is midline and mucous membranes are normal. No oral lesions. No trismus in the jaw. No uvula swelling. Posterior oropharyngeal erythema present. No oropharyngeal exudate, posterior oropharyngeal edema, tonsillar abscesses or cobblestoning. Tonsils are 1+ on the right. Tonsils are 1+ on the left. No tonsillar exudate. Oropharynx is clear.      Comments: No drooling, no tripoding. Patient is able to handle secretions. Patient appears to be in no acute respiratory distress. Airway is intact. Patient is able to talk in complete sentences without discomfort.  No drooling, no tripoding. Patient is able to handle secretions. Patient appears to be in no acute respiratory distress. Airway is intact. Patient is able to talk in complete sentences without discomfort.      Comments: No drooling, no tripoding. Patient is able to handle secretions. Patient appears to be in no acute respiratory distress. Airway is intact. Patient is able to talk in complete sentences without discomfort.  Cardiovascular: Normal rate, regular rhythm and normal heart sounds.   No murmur heard.Exam reveals no gallop and no friction rub.   Pulmonary/Chest: Effort normal and breath sounds normal. No stridor. No respiratory distress. He has no wheezes. He has no rhonchi. He has no rales. He exhibits no  tenderness.         Comments: Patient is in no respiratory distress. Breath sounds even, unlabored, and clear to auscultation bilaterally. No accessory muscle usage. Patient able to speak in complete sentences with ease.    Abdominal: Normal appearance and bowel sounds are normal. He exhibits no distension and no mass. Soft. There is no abdominal tenderness. There is no rebound, no guarding, no left CVA tenderness and no right CVA tenderness. No hernia.   Lymphadenopathy:     He has cervical adenopathy.   Neurological: He is alert.   Skin: Skin is not diaphoretic.   Nursing note and vitals reviewed.    Results for orders placed or performed in visit on 11/24/23   POCT Strep A, Molecular   Result Value Ref Range    Molecular Strep A, POC Negative Negative     Acceptable Yes    POCT Influenza A/B MOLECULAR   Result Value Ref Range    POC Molecular Influenza A Ag Negative Negative, Not Reported    POC Molecular Influenza B Ag Negative Negative, Not Reported     Acceptable Yes    SARS Coronavirus 2 Antigen, POCT Manual Read   Result Value Ref Range    SARS Coronavirus 2 Antigen Negative Negative     Acceptable Yes      Assessment:     1. Viral syndrome    2. Sore throat      Plan:   Previous notes reviewed.  Vital signs reviewed.  Labs ordered. Labs reviewed.  Discussed Viral Syndrome, home care, tx options, and given follow up precautions.  Patient was briefed on my thought process and diagnosis.   Patient involved with the treatment plan and agreed to the plan.  Patient informed on warning signs, patient understood warning signs and to go to urgent care or ER if warning signs appear.  Please excuse grammatical/spelling errors appreciated throughout this visit encounter for a remote dictation device was used during this encounter.    Patient Instructions   Please drink plenty of fluids.  Please get plenty of rest.  Please utilize saltwater gargles for sore throat.  Please  utilize warm tea, and lemon for sore throat relief.  Please utilize over-the-counter throat numbing spray and lozenges for sore throat relief.    Please return here or go to the Emergency Department for any concerns or worsening of condition.    Please consider Pedialyte and/or Gatorade/Powerade for hydration.  Please avoid dairy, spicy foods, greasy foods for symptom relief.  Please slow reintroduce your diet in the following pattern:   Liquids only, if you are able to tolerate, please move to the next step.   Soft foods only, if you are able to tolerate, please move to the next step.   [place] food only, if you are able to tolerate, please move to the next step.   Regular diet    If you do not have Hypertension or any history of palpitations, it is ok to take over the counter Sudafed or Mucinex D or Allegra-D or Claritin-D or Zyrtec-D.  If you do take one of the above, it is ok to combine that with plain over the counter Mucinex or Allegra or Claritin or Zyrtec.  If for example you are taking Zyrtec -D, you can combine that with Mucinex, but not Mucinex-D.  If you are taking Mucinex-D, you can combine that with plain Allegra or Claritin or Zyrtec.   If you do have Hypertension or palpitations, it is safe to take Coricidin HBP for relief of sinus symptoms.  We recommend you take over the counter Flonase (Fluticasone) or another nasally inhaled steroid unless you are already taking one.  Nasal irrigation with a saline spray or Netti Pot like device per their directions is also recommended.  If not allergic, please take over the counter Tylenol (Acetaminophen) and/or Motrin (Ibuprofen) as directed for control of pain and/or fever.  Please follow up with your primary care doctor or specialist as needed.    If you  smoke, please stop smoking.    Viral syndrome    Sore throat  -     POCT Strep A, Molecular  -     POCT Influenza A/B MOLECULAR  -     SARS Coronavirus 2 Antigen, POCT Manual Read      Jose Meneses,  LIO

## 2023-11-24 NOTE — PATIENT INSTRUCTIONS
Please drink plenty of fluids.  Please get plenty of rest.  Please utilize saltwater gargles for sore throat.  Please utilize warm tea, and lemon for sore throat relief.  Please utilize over-the-counter throat numbing spray and lozenges for sore throat relief.    Please return here or go to the Emergency Department for any concerns or worsening of condition.    Please consider Pedialyte and/or Gatorade/Powerade for hydration.  Please avoid dairy, spicy foods, greasy foods for symptom relief.  Please slow reintroduce your diet in the following pattern:   Liquids only, if you are able to tolerate, please move to the next step.   Soft foods only, if you are able to tolerate, please move to the next step.   [place] food only, if you are able to tolerate, please move to the next step.   Regular diet    If you do not have Hypertension or any history of palpitations, it is ok to take over the counter Sudafed or Mucinex D or Allegra-D or Claritin-D or Zyrtec-D.  If you do take one of the above, it is ok to combine that with plain over the counter Mucinex or Allegra or Claritin or Zyrtec.  If for example you are taking Zyrtec -D, you can combine that with Mucinex, but not Mucinex-D.  If you are taking Mucinex-D, you can combine that with plain Allegra or Claritin or Zyrtec.   If you do have Hypertension or palpitations, it is safe to take Coricidin HBP for relief of sinus symptoms.  We recommend you take over the counter Flonase (Fluticasone) or another nasally inhaled steroid unless you are already taking one.  Nasal irrigation with a saline spray or Netti Pot like device per their directions is also recommended.  If not allergic, please take over the counter Tylenol (Acetaminophen) and/or Motrin (Ibuprofen) as directed for control of pain and/or fever.  Please follow up with your primary care doctor or specialist as needed.    If you  smoke, please stop smoking.

## 2025-08-07 ENCOUNTER — HOSPITAL ENCOUNTER (OUTPATIENT)
Age: 45
Discharge: HOME | End: 2025-08-07

## 2025-08-07 VITALS
DIASTOLIC BLOOD PRESSURE: 87 MMHG | HEART RATE: 99 BPM | RESPIRATION RATE: 18 BRPM | TEMPERATURE: 99 F | SYSTOLIC BLOOD PRESSURE: 136 MMHG | OXYGEN SATURATION: 99 %

## 2025-08-07 DIAGNOSIS — H92.02 LEFT EAR PAIN: Primary | ICD-10-CM

## 2025-08-07 PROCEDURE — 99212 OFFICE O/P EST SF 10 MIN: CPT | Performed by: NURSE PRACTITIONER
